# Patient Record
Sex: FEMALE | Race: OTHER | HISPANIC OR LATINO
[De-identification: names, ages, dates, MRNs, and addresses within clinical notes are randomized per-mention and may not be internally consistent; named-entity substitution may affect disease eponyms.]

---
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
## 2022-01-11 ENCOUNTER — APPOINTMENT (OUTPATIENT)
Dept: PEDIATRIC ALLERGY IMMUNOLOGY | Facility: CLINIC | Age: 22
End: 2022-01-11
Payer: COMMERCIAL

## 2022-01-11 PROCEDURE — 95165 ANTIGEN THERAPY SERVICES: CPT

## 2022-01-11 PROCEDURE — 95117 IMMUNOTHERAPY INJECTIONS: CPT

## 2022-01-11 RX ORDER — CETIRIZINE HYDROCHLORIDE 10 MG/1
10 TABLET, COATED ORAL
Qty: 0 | Refills: 0 | Status: COMPLETED | OUTPATIENT
Start: 2022-01-11

## 2022-01-18 ENCOUNTER — APPOINTMENT (OUTPATIENT)
Dept: PEDIATRIC ALLERGY IMMUNOLOGY | Facility: CLINIC | Age: 22
End: 2022-01-18
Payer: COMMERCIAL

## 2022-01-18 PROCEDURE — 95117 IMMUNOTHERAPY INJECTIONS: CPT

## 2022-01-18 PROCEDURE — 95165 ANTIGEN THERAPY SERVICES: CPT

## 2022-01-25 ENCOUNTER — APPOINTMENT (OUTPATIENT)
Dept: PEDIATRIC ALLERGY IMMUNOLOGY | Facility: CLINIC | Age: 22
End: 2022-01-25
Payer: COMMERCIAL

## 2022-01-25 PROCEDURE — 95117 IMMUNOTHERAPY INJECTIONS: CPT

## 2022-01-25 PROCEDURE — 95165 ANTIGEN THERAPY SERVICES: CPT

## 2022-02-22 ENCOUNTER — APPOINTMENT (OUTPATIENT)
Dept: PEDIATRIC ALLERGY IMMUNOLOGY | Facility: CLINIC | Age: 22
End: 2022-02-22
Payer: COMMERCIAL

## 2022-02-22 PROCEDURE — 95117 IMMUNOTHERAPY INJECTIONS: CPT

## 2022-02-22 PROCEDURE — 95165 ANTIGEN THERAPY SERVICES: CPT

## 2022-03-22 ENCOUNTER — APPOINTMENT (OUTPATIENT)
Dept: PEDIATRIC ALLERGY IMMUNOLOGY | Facility: CLINIC | Age: 22
End: 2022-03-22
Payer: COMMERCIAL

## 2022-03-22 DIAGNOSIS — J30.81 ALLERGIC RHINITIS DUE TO ANIMAL (CAT) (DOG) HAIR AND DANDER: ICD-10-CM

## 2022-03-22 DIAGNOSIS — J30.89 OTHER ALLERGIC RHINITIS: ICD-10-CM

## 2022-03-22 DIAGNOSIS — J30.1 ALLERGIC RHINITIS DUE TO POLLEN: ICD-10-CM

## 2022-03-22 PROCEDURE — 95117 IMMUNOTHERAPY INJECTIONS: CPT

## 2022-03-22 PROCEDURE — 95165 ANTIGEN THERAPY SERVICES: CPT

## 2022-05-18 ENCOUNTER — APPOINTMENT (OUTPATIENT)
Dept: PEDIATRIC ALLERGY IMMUNOLOGY | Facility: CLINIC | Age: 22
End: 2022-05-18

## 2022-06-14 ENCOUNTER — APPOINTMENT (OUTPATIENT)
Dept: PEDIATRIC ALLERGY IMMUNOLOGY | Facility: CLINIC | Age: 22
End: 2022-06-14

## 2022-06-14 DIAGNOSIS — Z51.6 ENCOUNTER FOR DESENSITIZATION TO ALLERGENS: ICD-10-CM

## 2022-08-19 ENCOUNTER — APPOINTMENT (OUTPATIENT)
Dept: NEUROLOGY | Facility: CLINIC | Age: 22
End: 2022-08-19

## 2022-08-19 VITALS
WEIGHT: 120 LBS | BODY MASS INDEX: 22.08 KG/M2 | HEIGHT: 62 IN | HEART RATE: 77 BPM | SYSTOLIC BLOOD PRESSURE: 114 MMHG | DIASTOLIC BLOOD PRESSURE: 71 MMHG

## 2022-08-19 PROCEDURE — 99215 OFFICE O/P EST HI 40 MIN: CPT

## 2022-08-19 RX ORDER — METHYLPHENIDATE HYDROCHLORIDE 10 MG/1
10 TABLET ORAL
Refills: 0 | Status: DISCONTINUED | COMMUNITY
Start: 2021-07-24 | End: 2022-08-19

## 2022-08-19 RX ORDER — METHYLPHENIDATE HYDROCHLORIDE 20 MG/1
20 TABLET ORAL
Refills: 0 | Status: DISCONTINUED | COMMUNITY
Start: 2021-07-24 | End: 2022-08-19

## 2022-08-20 NOTE — ASSESSMENT
[FreeTextEntry1] : Patient will benefit from psychologic counseling to better cope with stress.  Suggested cognitive behavioral therapy and referral provided.  She will start methylphenidate 20 mg extended release once daily.  She will schedule a telehealth follow-up in 5 weeks.

## 2022-08-20 NOTE — HISTORY OF PRESENT ILLNESS
[FreeTextEntry1] : 22-year-old woman seen 1 year ago with following history: She was diagnosed attention deficit disorder 10 years ago by pediatric neurologist At [facility], Dr. Conley.  Parents were against medication for attention deficit at that time.  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  She recently saw Dr. Maxwell, a neurologist in [place] who prescribed methylphenidate 10 mg tablet.  Patient reports it wore off very quickly but had a brief benefit.  Nurse practitioner in Dr. Maxwell's office increased the dosage to 20 mg once daily and patient has yet to  the tablets.  Ms. Cohn is desirous of  additional opinion regarding management of her attention deficit disorder.\par \par The patient admits to poor concentration and focus.  Frequently loses things and misplaces things.\par \par Since last seen she reports her parents again were against her taking medication for her attention disorder. 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 She wishes to restart methylphenidate. She also requests a letter requesting no roomates which has impacted her concentration to her studies.

## 2022-08-20 NOTE — PHYSICAL EXAM
[FreeTextEntry1] : No cognitive or communication deficits.  Cranial nerves intact.  No sensorimotor deficits.

## 2022-09-15 ENCOUNTER — APPOINTMENT (OUTPATIENT)
Dept: NEUROLOGY | Facility: CLINIC | Age: 22
End: 2022-09-15

## 2022-09-15 VITALS
HEIGHT: 62 IN | WEIGHT: 120 LBS | BODY MASS INDEX: 22.08 KG/M2 | SYSTOLIC BLOOD PRESSURE: 104 MMHG | DIASTOLIC BLOOD PRESSURE: 65 MMHG | HEART RATE: 70 BPM

## 2022-09-15 PROCEDURE — 99214 OFFICE O/P EST MOD 30 MIN: CPT

## 2022-09-15 NOTE — PHYSICAL EXAM
[FreeTextEntry1] : No cognitive or communication deficits.  No focal sensorimotor deficits.  Gait and coordination intact.

## 2022-09-15 NOTE — HISTORY OF PRESENT ILLNESS
[FreeTextEntry1] : 22-year-old woman with attention deficit disorder reports methylphenidate 20 mg extended release is helping.  Last month I gave her a letter requesting reasonable accommodations and requested that she be allowed to stay in a single room to avoid the distraction of having roommates.  [facility] claimed the letter was inadequate.\par \par Patient has a history of multiple allergies and her roommates do not contribute to cleaning the room.\par \par A second letter requesting reasonable accommodations provided today (copy in electronic health record)

## 2022-09-23 ENCOUNTER — APPOINTMENT (OUTPATIENT)
Dept: NEUROLOGY | Facility: CLINIC | Age: 22
End: 2022-09-23

## 2022-09-28 ENCOUNTER — NON-APPOINTMENT (OUTPATIENT)
Age: 22
End: 2022-09-28

## 2022-10-07 ENCOUNTER — APPOINTMENT (OUTPATIENT)
Dept: NEUROLOGY | Facility: CLINIC | Age: 22
End: 2022-10-07

## 2022-10-07 PROCEDURE — 99212 OFFICE O/P EST SF 10 MIN: CPT | Mod: 95

## 2022-10-08 NOTE — REASON FOR VISIT
[Home] : at home, [unfilled] , at the time of the visit. [Medical Office: ([facility])___] : at the medical office located in  [Patient] : the patient [Follow-Up: _____] : a [unfilled] follow-up visit

## 2022-10-08 NOTE — HISTORY OF PRESENT ILLNESS
[FreeTextEntry1] : 22-year-old woman with attention deficit disorder is doing well on methylphenidate extended release 20 mg once daily.  She reports that [facility] refused to provide her with the accommodations requested in the 2 letters I wrote.\par \par She denies any adverse effects from the methylphenidate.

## 2022-11-14 ENCOUNTER — NON-APPOINTMENT (OUTPATIENT)
Age: 22
End: 2022-11-14

## 2022-12-14 PROBLEM — Z51.6 NEED FOR DESENSITIZATION TO ALLERGENS: Status: ACTIVE | Noted: 2020-11-24

## 2023-01-20 ENCOUNTER — APPOINTMENT (OUTPATIENT)
Dept: NEUROLOGY | Facility: CLINIC | Age: 23
End: 2023-01-20
Payer: COMMERCIAL

## 2023-01-20 DIAGNOSIS — F98.8 OTHER SPECIFIED BEHAVIORAL AND EMOTIONAL DISORDERS WITH ONSET USUALLY OCCURRING IN CHILDHOOD AND ADOLESCENCE: ICD-10-CM

## 2023-01-20 PROCEDURE — 99212 OFFICE O/P EST SF 10 MIN: CPT | Mod: 95

## 2023-01-20 RX ORDER — METHYLPHENIDATE HYDROCHLORIDE 20 MG/1
20 TABLET, EXTENDED RELEASE ORAL
Qty: 30 | Refills: 0 | Status: ACTIVE | COMMUNITY
Start: 2022-08-19 | End: 1900-01-01

## 2023-01-20 NOTE — REASON FOR VISIT
[Home] : at home, [unfilled] , at the time of the visit. [Medical Office: ([facility])___] : at the medical office located in  [Patient] : the patient [Follow-Up: _____] : a [unfilled] follow-up visit

## 2023-01-20 NOTE — HISTORY OF PRESENT ILLNESS
[FreeTextEntry1] : 22-year-old woman with attention deficit disorder doing well on methylphenidate 20 mg extended release once daily.  No adverse effects noted.

## 2023-09-26 ENCOUNTER — NON-APPOINTMENT (OUTPATIENT)
Age: 23
End: 2023-09-26

## 2023-09-29 ENCOUNTER — APPOINTMENT (OUTPATIENT)
Dept: ORTHOPEDIC SURGERY | Facility: CLINIC | Age: 23
End: 2023-09-29
Payer: COMMERCIAL

## 2023-09-29 ENCOUNTER — NON-APPOINTMENT (OUTPATIENT)
Age: 23
End: 2023-09-29

## 2023-09-29 VITALS — BODY MASS INDEX: 22.08 KG/M2 | WEIGHT: 120 LBS | HEIGHT: 62 IN

## 2023-09-29 DIAGNOSIS — M53.3 SACROCOCCYGEAL DISORDERS, NOT ELSEWHERE CLASSIFIED: ICD-10-CM

## 2023-09-29 PROCEDURE — 99213 OFFICE O/P EST LOW 20 MIN: CPT

## 2023-10-18 ENCOUNTER — NON-APPOINTMENT (OUTPATIENT)
Age: 23
End: 2023-10-18

## 2023-10-19 ENCOUNTER — RESULT REVIEW (OUTPATIENT)
Age: 23
End: 2023-10-19

## 2023-12-07 NOTE — PACU DISCHARGE NOTE - HYDRATION STATUS:
Received call from patient requesting refill(s) of oxyCODONE (ROXICODONE) 5 MG tablet       Last dispensed from pharmacy on 11/17/23    Patient's last office/virtual visit by prescribing provider on 11/17/23  Next office/virtual appointment scheduled for 12/18/23    Last urine drug screen date 11/03/23  Current opioid agreement on file (completed within the last year) Yes Date of opioid agreement: 11/03/23    E-prescribe to pharmacy-[facility] [id number] [place] [id number]     Will route to nursing [facility] for review and preparation of prescription(s).        Satisfactory

## 2024-02-19 ENCOUNTER — NON-APPOINTMENT (OUTPATIENT)
Age: 24
End: 2024-02-19

## 2024-04-02 ENCOUNTER — OFFICE (OUTPATIENT)
Dept: URBAN - METROPOLITAN AREA CLINIC 35 | Facility: CLINIC | Age: 24
Setting detail: OPHTHALMOLOGY
End: 2024-04-02
Payer: COMMERCIAL

## 2024-04-02 VITALS — HEIGHT: 62 IN | WEIGHT: 125 LBS | BODY MASS INDEX: 23 KG/M2

## 2024-04-02 DIAGNOSIS — H02.825: ICD-10-CM

## 2024-04-02 DIAGNOSIS — H02.822: ICD-10-CM

## 2024-04-02 PROBLEM — G51.4 MYOKIYMIA: Status: ACTIVE | Noted: 2024-04-02

## 2024-04-02 PROBLEM — H52.7 REFRACTIVE ERROR: Status: ACTIVE | Noted: 2024-04-02

## 2024-04-02 PROCEDURE — 92002 INTRM OPH EXAM NEW PATIENT: CPT | Performed by: OPHTHALMOLOGY

## 2024-05-14 ENCOUNTER — NON-APPOINTMENT (OUTPATIENT)
Age: 24
End: 2024-05-14

## 2024-09-03 ENCOUNTER — NON-APPOINTMENT (OUTPATIENT)
Age: 24
End: 2024-09-03

## 2024-09-04 ENCOUNTER — TRANSCRIPTION ENCOUNTER (OUTPATIENT)
Age: 24
End: 2024-09-04

## 2024-11-12 ENCOUNTER — NON-APPOINTMENT (OUTPATIENT)
Age: 24
End: 2024-11-12